# Patient Record
(demographics unavailable — no encounter records)

---

## 2025-01-29 NOTE — ASSESSMENT
[FreeTextEntry1] : Impression: Fracture left distal radius.  The cast has been removed she still has some local tenderness present.  She will use a cock up Velcro wrist splint which can be removed for shower purposes only no sports as yet return in 2 weeks for x-rays of the

## 2025-01-29 NOTE — HISTORY OF PRESENT ILLNESS
[FreeTextEntry1] : This 11-year-old returns for follow-up of her left wrist fracture.  She is very comfortable in her cast no significant complaints noted

## 2025-01-29 NOTE — PHYSICAL EXAM
[FreeTextEntry1] : Examination today reveals a well-fitting cast no foul smell no swelling moving the fingers well neurovascular status is intact.  X-rays ordered and taken today 3 views of the left wrist reviewed interpreted by myself reveal good alignment of the distal radius fracture

## 2025-04-24 NOTE — HISTORY OF PRESENT ILLNESS
[FreeTextEntry1] : This 11-year-old is seen today for evaluation of her left ankle.  Approximately 7-9 days ago she was practicing karate rolled her ankle and this caused swelling discomfort stiffness and limp.  Prior to this she had been doing well

## 2025-04-24 NOTE — PHYSICAL EXAM
[FreeTextEntry1] : Examination today reveals a mild antalgic gait on the left side she has moderate swelling along the lateral aspect of the ankle where she is tender over the lateral ligaments less so to the malleolus she is not tender medially minimal joint line tenderness is noted laterally.  She does have good motion to the ankle subtalar joint there is no obvious instability on stress compartments are soft neurovascular status is intact.  X-rays ordered and taken today of the left ankle reviewed interpreted by myself 3 views are negative for fracture/loose body/OCD

## 2025-04-24 NOTE — ASSESSMENT
[FreeTextEntry1] : Impression: Sprain left ankle.  This child will be treated symptomatically with over-the-counter nonsteroidals for discomfort restriction of gym/sports on the order of 2 weeks time.  Return if she has not improved